# Patient Record
Sex: MALE | Race: ASIAN | NOT HISPANIC OR LATINO | Employment: FULL TIME | URBAN - METROPOLITAN AREA
[De-identification: names, ages, dates, MRNs, and addresses within clinical notes are randomized per-mention and may not be internally consistent; named-entity substitution may affect disease eponyms.]

---

## 2023-09-13 ENCOUNTER — OFFICE VISIT (OUTPATIENT)
Dept: FAMILY MEDICINE | Facility: CLINIC | Age: 23
End: 2023-09-13

## 2023-09-13 VITALS
HEART RATE: 99 BPM | SYSTOLIC BLOOD PRESSURE: 124 MMHG | OXYGEN SATURATION: 97 % | RESPIRATION RATE: 17 BRPM | DIASTOLIC BLOOD PRESSURE: 82 MMHG | TEMPERATURE: 98.5 F | WEIGHT: 111 LBS

## 2023-09-13 DIAGNOSIS — J02.0 STREP THROAT: Primary | ICD-10-CM

## 2023-09-13 DIAGNOSIS — R07.0 THROAT PAIN: ICD-10-CM

## 2023-09-13 LAB — DEPRECATED S PYO AG THROAT QL EIA: POSITIVE

## 2023-09-13 PROCEDURE — 99203 OFFICE O/P NEW LOW 30 MIN: CPT | Performed by: PHYSICIAN ASSISTANT

## 2023-09-13 PROCEDURE — 87880 STREP A ASSAY W/OPTIC: CPT | Performed by: PHYSICIAN ASSISTANT

## 2023-09-13 RX ORDER — PENICILLIN V POTASSIUM 500 MG/1
500 TABLET, FILM COATED ORAL 2 TIMES DAILY
Qty: 20 TABLET | Refills: 0 | Status: SHIPPED | OUTPATIENT
Start: 2023-09-13 | End: 2023-09-23

## 2023-09-13 NOTE — PROGRESS NOTES
Patient presents with:  Pharyngitis: X 3 days, throat pain, hurts to swallow, unable to drink and eat. fever, nasal congestion, phlegm when coughing.       Clinical Decision Making:  Strep test was obtained and was positive. Treatment with Pen VK.  Symptomatic care was gone over. Expected course of resolution and indication for return was gone over and questions were answered to patient/parent's satisfaction before discharge.      ICD-10-CM    1. Strep throat  J02.0 penicillin V (VEETID) 500 MG tablet      2. Throat pain  R07.0 Streptococcus A Rapid Screen w/Reflex to PCR - Clinic Collect          Patient Instructions   Suggested increased rest increased fluids and bedside humidification  Over-the-counter Tylenol for comfort.  Follow packaging directions  Over-the-counter throat lozenges with benzocaine, such as Cepacol, may be used if indicated and is not a choking hazard based on age.    Follow packaging directions.    Do not overuse the benzocaine as it will dry the throat and make it uncomfortable.  May use the throat lozenges one lozenge per hour.  May use hard candies or lemon drops etc. to keep the saliva flowing for comfort until the next hour interval dosing for the lozenge  Noncontagious after 24 hours on the antibiotic.  Change toothbrush out after 48 hours to avoid reinfecting the mouth.  Follow-up after completion of the antibiotic if not completely resolved with symptoms or new symptoms or concerns arise.        HPI:  Reyes Martin is a 23 year old male who presents today for a three day acute onset of sore throat and odynophagia.  Patient denies fever, chills, night sweats, fatigue, vomiting, diarrhea, skin rash, abdominal pain or urinary symptoms.      No known sick contacts for strep throat.    Has not tried treatment for this over-the-counter.    History obtained from chart review and the patient.    Problem List:  There are no relevant problems documented for this patient.      History reviewed. No  pertinent past medical history.    Social History     Tobacco Use    Smoking status: Never    Smokeless tobacco: Not on file   Substance Use Topics    Alcohol use: Not on file       Review of Systems  As above in HPI otherwise negative.    Vitals:    09/13/23 1731   BP: 124/82   BP Location: Right arm   Patient Position: Sitting   Cuff Size: Adult Small   Pulse: 99   Resp: 17   Temp: 98.5  F (36.9  C)   TempSrc: Oral   SpO2: 97%   Weight: 50.3 kg (111 lb)       General: Patient is resting comfortably no acute distress is afebrile  HEENT: Head is normocephalic atraumatic   eyes are PERRL EOMI sclera anicteric   TMs are clear bilaterally  Throat is with pharyngeal wall erythema and 3+ tonsils with white exudate.  Uvula is midline soft palate is not deviated.  Positive cervical lymphadenopathy and tenderness to palpation.  LUNGS: Clear to auscultation bilaterally  HEART: Regular rate and rhythm  Skin: Without rash non-diaphoretic    Physical Exam      Labs:  Results for orders placed or performed in visit on 09/13/23   Streptococcus A Rapid Screen w/Reflex to PCR - Clinic Collect     Status: Abnormal    Specimen: Throat; Swab   Result Value Ref Range    Group A Strep antigen Positive (A) Negative     At the end of the encounter, I discussed results, diagnosis, medications. Discussed red flags for immediate return to clinic/ER, as well as indications for follow up if no improvement. Patient understood and agreed to plan. Patient was stable for discharge.

## 2023-09-13 NOTE — PATIENT INSTRUCTIONS
Suggested increased rest increased fluids and bedside humidification  Over-the-counter Tylenol for comfort.  Follow packaging directions  Over-the-counter throat lozenges with benzocaine, such as Cepacol, may be used if indicated and is not a choking hazard based on age.    Follow packaging directions.    Do not overuse the benzocaine as it will dry the throat and make it uncomfortable.  May use the throat lozenges one lozenge per hour.  May use hard candies or lemon drops etc. to keep the saliva flowing for comfort until the next hour interval dosing for the lozenge  Noncontagious after 24 hours on the antibiotic.  Change toothbrush out after 48 hours to avoid reinfecting the mouth.  Follow-up after completion of the antibiotic if not completely resolved with symptoms or new symptoms or concerns arise.

## 2023-09-13 NOTE — LETTER
September 13, 2023      Reyes Martin  [address]  [place] [address]        To Whom It May Concern:    Reyes Martin was seen in our clinic. He may return to work without restrictions on 9/15/23.      Sincerely,        Josh Conti PA-C

## 2023-10-20 ENCOUNTER — OFFICE VISIT (OUTPATIENT)
Dept: FAMILY MEDICINE | Facility: CLINIC | Age: 23
End: 2023-10-20

## 2023-10-20 ENCOUNTER — HOSPITAL ENCOUNTER (OUTPATIENT)
Dept: GENERAL RADIOLOGY | Facility: HOSPITAL | Age: 23
Discharge: HOME | End: 2023-10-20
Attending: NURSE PRACTITIONER | Admitting: NURSE PRACTITIONER

## 2023-10-20 VITALS
HEART RATE: 106 BPM | TEMPERATURE: 98.1 F | RESPIRATION RATE: 24 BRPM | DIASTOLIC BLOOD PRESSURE: 81 MMHG | SYSTOLIC BLOOD PRESSURE: 119 MMHG | OXYGEN SATURATION: 95 %

## 2023-10-20 DIAGNOSIS — M25.571 PAIN IN JOINT, ANKLE AND FOOT, RIGHT: ICD-10-CM

## 2023-10-20 DIAGNOSIS — S93.401A SPRAIN OF RIGHT ANKLE, UNSPECIFIED LIGAMENT, INITIAL ENCOUNTER: Primary | ICD-10-CM

## 2023-10-20 PROCEDURE — 99214 OFFICE O/P EST MOD 30 MIN: CPT | Performed by: NURSE PRACTITIONER

## 2023-10-20 PROCEDURE — 73610 X-RAY EXAM OF ANKLE: CPT | Mod: RT

## 2023-10-20 NOTE — PATIENT INSTRUCTIONS
No broken bones.  Use crutches -avoid weightbearing for now.     Okay to stop using crutches if you are able to hop on your left ankle 10 times without pain.  Otherwise, keep using crutches.     Keep your foot elevated as much as possible to reduce swelling.  Apply ice 20 minutes 4-6 times per day for the first 2 days.    Recheck in 10 days if not better in your clinic.      Tylenol or ibuprofen as needed.  Do not use ibuprofen if you have a history of kidney disease.    Recommend 600 mg of ibuprofen every 6 hours or 1000 mg of Tylenol every 8 hours.

## 2023-10-20 NOTE — LETTER
October 20, 2023      Reyes Martin  [address] [place] [address]        To Whom It May Concern:    Reyes Martin was seen in our clinic. He may return to work with the following: limited to light duty -seated work only with ability to elevate right foot on or about 10/21.  Note valid for 10 days or until he is able to perform work usual duties with minimal to no pain per his own judgement.      Sincerely,        Danna Coles, CNP

## 2023-10-20 NOTE — PROGRESS NOTES
Assessment & Plan     Pain in joint, ankle and foot, right    - XR Ankle Right G/E 3 Views    Sprain of right ankle, unspecified ligament, initial encounter    - Crutches Order for DME - ONLY FOR DME     Ankle pain after jumping up for a block and landing with inverted right ankle yesterday.  + [place] ankle rules with tenderness over the lateral malleolar areas.    X-rays negative.  Ankle sprain precautions discussed.  See AVS for details.  Light duty seated work note provided.    Crutches, RICE.  Right ankle was Ace wrapped.    Recheck in 10 days if not much better.          Return in about 10 days (around 10/30/2023) for If no better.    Danna Coles, CNP  [facility] JENNIFER Chambers is a 23 year old male who presents to clinic today for the following health issues:  Chief Complaint   Patient presents with    Trauma     Sprained while playing volleyball Rt ankle x yesterday. Stiffness, unable to apply weight on foot, slight pain when walk on heel per pt. No swelling or bruising.    Letter for School/Work     Work note of today's visit     Trauma        Was jumping off for a block yesterday at [employer] and landed with inverted right ankle.  Did not feel any pain immediately after, but started hurting a bit later on.  Difficulty bearing weight.  Rates pain 6 out of 10 with bearing weight in about 1 with sitting still.        Review of Systems        Objective    /81 (BP Location: Left arm, Patient Position: Sitting, Cuff Size: Adult Small)   Pulse 106   Temp 98.1  F (36.7  C) (Oral)   Resp 24   SpO2 95%   Physical Exam  Constitutional:       Appearance: Normal appearance.   Cardiovascular:      Pulses: Normal pulses.   Pulmonary:      Effort: Pulmonary effort is normal.   Musculoskeletal:         General: Tenderness (+ [place] ankle rules.  Tender lateral malleolus/fibula tip and lateral edge.  No other areas of tenderness including the fifth metatarsal.) present. No  swelling.   Neurological:      Mental Status: He is alert.   Psychiatric:         Mood and Affect: Mood normal.        I independently visualized the xray: Neg for fx.      Results for orders placed or performed during the hospital encounter of 10/20/23   XR Ankle Right G/E 3 Views     Status: None    Narrative    EXAM: XR ANKLE RIGHT G/E 3 VIEWS  LOCATION: [facility]  DATE: 10/20/2023    INDICATION: Lateral ankle sprain. Pain.  COMPARISON: None.      Impression    IMPRESSION: Normal joint spaces and alignment. No fracture.

## 2023-12-09 ENCOUNTER — HEALTH MAINTENANCE LETTER (OUTPATIENT)
Age: 23
End: 2023-12-09

## 2025-01-05 ENCOUNTER — HEALTH MAINTENANCE LETTER (OUTPATIENT)
Age: 25
End: 2025-01-05